# Patient Record
Sex: MALE | Race: WHITE | Employment: OTHER | URBAN - METROPOLITAN AREA
[De-identification: names, ages, dates, MRNs, and addresses within clinical notes are randomized per-mention and may not be internally consistent; named-entity substitution may affect disease eponyms.]

---

## 2017-02-01 DIAGNOSIS — C64.9: Primary | ICD-10-CM

## 2017-02-03 ENCOUNTER — PRE VISIT (OUTPATIENT)
Dept: UROLOGY | Facility: CLINIC | Age: 57
End: 2017-02-03

## 2017-02-09 ENCOUNTER — OFFICE VISIT (OUTPATIENT)
Dept: UROLOGY | Facility: CLINIC | Age: 57
End: 2017-02-09

## 2017-02-09 VITALS
BODY MASS INDEX: 30.2 KG/M2 | DIASTOLIC BLOOD PRESSURE: 80 MMHG | HEIGHT: 72 IN | HEART RATE: 83 BPM | WEIGHT: 223 LBS | SYSTOLIC BLOOD PRESSURE: 125 MMHG

## 2017-02-09 DIAGNOSIS — C64.9: ICD-10-CM

## 2017-02-09 DIAGNOSIS — C64.2 MALIGNANT NEOPLASM OF KIDNEY, LEFT (H): Primary | ICD-10-CM

## 2017-02-09 LAB
ANION GAP SERPL CALCULATED.3IONS-SCNC: 11 MMOL/L (ref 3–14)
BUN SERPL-MCNC: 23 MG/DL (ref 7–30)
CALCIUM SERPL-MCNC: 9.4 MG/DL (ref 8.5–10.1)
CHLORIDE SERPL-SCNC: 106 MMOL/L (ref 94–109)
CO2 SERPL-SCNC: 26 MMOL/L (ref 20–32)
CREAT BLD-MCNC: 1 MG/DL (ref 0.66–1.25)
CREAT SERPL-MCNC: 0.98 MG/DL (ref 0.66–1.25)
ERYTHROCYTE [DISTWIDTH] IN BLOOD BY AUTOMATED COUNT: 20.4 % (ref 10–15)
GFR SERPL CREATININE-BSD FRML MDRD: 77 ML/MIN/1.7M2
GFR SERPL CREATININE-BSD FRML MDRD: 79 ML/MIN/1.7M2
GLUCOSE SERPL-MCNC: 178 MG/DL (ref 70–99)
HCT VFR BLD AUTO: 39.2 % (ref 40–53)
HGB BLD-MCNC: 12.2 G/DL (ref 13.3–17.7)
MCH RBC QN AUTO: 25.2 PG (ref 26.5–33)
MCHC RBC AUTO-ENTMCNC: 31.1 G/DL (ref 31.5–36.5)
MCV RBC AUTO: 81 FL (ref 78–100)
PLATELET # BLD AUTO: 202 10E9/L (ref 150–450)
POTASSIUM SERPL-SCNC: 4.1 MMOL/L (ref 3.4–5.3)
RBC # BLD AUTO: 4.84 10E12/L (ref 4.4–5.9)
SODIUM SERPL-SCNC: 142 MMOL/L (ref 133–144)
WBC # BLD AUTO: 6.3 10E9/L (ref 4–11)

## 2017-02-09 ASSESSMENT — PAIN SCALES - GENERAL: PAINLEVEL: NO PAIN (0)

## 2017-02-09 NOTE — MR AVS SNAPSHOT
After Visit Summary   2017    Hank Louie    MRN: [id number]           Patient Information     Date Of Birth          1960        Visit Information        Provider Department      2017 3:00 PM Weight, Nicolas Alexandre MD [facility] and [facility]        Today's Diagnoses     Malignant neoplasm of kidney, left (H)    -  1       Follow-ups after your visit        Follow-up notes from your care team     Return in about 1 year (around 2018).      Who to contact     Please call your clinic at [phone] to:    Ask questions about your health    Make or cancel appointments    Discuss your medicines    Learn about your test results    Speak to your doctor   If you have compliments or concerns about an experience at your clinic, or if you wish to file a complaint, please contact [facility] Patient Relations at [phone] or email us at [email or web address]         Additional Information About Your Visit        MyChart Information     [employer] is an electronic gateway that provides easy, online access to your medical records. With [employer], you can request a clinic appointment, read your test results, renew a prescription or communicate with your care team.     To sign up for [employer] visit the website at [email or web address]   You will be asked to enter the access code listed below, as well as some personal information. Please follow the directions to create your username and password.     Your access code is: [email or web address]  Expires: 2017  6:30 AM     Your access code will  in 90 days. If you need help or a new code, please contact your [facility] or call [phone] for assistance.        Care EveryWhere ID     This is your Care EveryWhere ID. This could be used by other organizations to access your [place] medical records  [id number]        Your Vitals Were     Pulse  Height BMI (Body Mass Index)             83 1.829 m (6') 30.24 kg/m2          Blood Pressure from Last 3 Encounters:   02/09/17 125/80   09/22/16 131/78   02/18/16 128/82    Weight from Last 3 Encounters:   02/09/17 101.2 kg (223 lb)   09/22/16 99.3 kg (219 lb)   02/18/16 96.5 kg (212 lb 11.2 oz)              Today, you had the following     No orders found for display       Primary Care Provider Office Phone # Fax #    Kevin Ho [phone]       [place] [address]        Thank you!     Thank you for choosing [facility] UROLOGY AND [facility] PR[facility] UROLOGIC [facility]ERS  for your care. Our goal is always to provide you with excellent care. Hearing back from our patients is one way we can continue to improve our services. Please take a few minutes to complete the written survey that you may receive in the mail after your visit with us. Thank you!             Your Updated Medication List - Protect others around you: Learn how to safely use, store and throw away your medicines at www.disposemymeds.org.          This list is accurate as of: 2/9/17 11:59 PM.  Always use your most recent med list.                   Brand Name Dispense Instructions for use    acetaminophen 325 MG tablet    TYLENOL    100 tablet    Take 1-2 tablets (325-650 mg) by mouth every 4 hours as needed for mild pain or fever       acetaminophen-caffeine 500-65 MG Tabs    EXCEDRIN TENSION HEADACHE     Take 3 tablets by mouth every 6 hours as needed for mild pain       amLODIPine-benazepril 10-20 MG per capsule    LOTREL     Take 1 capsule by mouth daily       aspirin 81 MG tablet      Take by mouth daily       ATORVASTATIN CALCIUM PO      Take 40 mg by mouth       canaliflozin tablet    INVOKANA     Take 300 mg by mouth every morning (before breakfast)       CARVEDILOL PO      Take 12.5 mg by mouth       LISINOPRIL PO      Take 5 mg by mouth       losartan-hydrochlorothiazide 50-12.5 MG per  tablet    HYZAAR     Take 1 tablet by mouth daily       NITROSTAT SL      Place 0.4 mg under the tongue       sitagliptin-metFORMIN  MG per tablet    JANUMET     Take 1 tablet by mouth 2 times daily (with meals)

## 2017-02-09 NOTE — NURSING NOTE
Chief Complaint   Patient presents with     RECHECK     Adrenal Cancer Follow up     Shanda Bray RN

## 2017-02-09 NOTE — LETTER
2/9/2017       RE: Hank Louie  [address]  [place] [address]     Dear Colleague,    Thank you for referring your patient, Hank Louie, to the [facility]OLOG[facility] AND [facility]OLOG[facility] at [facility]. Please see a copy of my visit note below.    Patient needed to leave prior to being seen.  I just waived to him in the mark, so no charge for a clinic visit.    Kidney Cancer Follow up     Hank Louie is a very pleasant 56 year old male who presents with a history of a Renal Cell Cancer.      Had a 5 vessel CABG 6 months after kidney surgery     The histologic subtype was Clear Cell.    Furhman Grade 2.    Pathologic Stage T1b see below*.    Maximal tumor dimension was 6.5 cm.    Tumor thrombus level  not applicable.    Tumor necrosis present: No  Sarcomatoid features present: No  Patient is status post Robotic Partial Nephrectomy on 2/3/2015.      The 10-year estimated risk of dying from kidney cancer is: 15%, 85% chance of survival  (Journal of Urology, Vol 168, 2002: p 2395, Journal of Urology, Vol 177, 2007: p 477 [email or web address])    There is no evidence of disease recurrence to date.    CT shows no nodes, no mets per my read, CXR normal, final read pending      Recent Kidney Function  Lab Results   Component Value Date    CR 0.98 02/09/2017    CR 0.88 09/22/2016    CR 0.91 02/18/2016    CR 0.81 11/16/2015    CR 0.92 08/13/2015    CR 0.86 05/07/2015    CR 0.82 02/05/2015    CR 0.92 02/04/2015         Plan Hx of Renal Cell Cancer [id number], Furhman grade 2, s/p Robotic Partial Nephrectomy   Follow up in 12 months with    CT of Abd    Chest XR    BMP, CBC      Nicolas Masters MD  Department of Urology  [facility]      *AJCC/pTNM staging 2010 where   T1a (<=4cm), T1b  (4-7cm)  T2a (7-10 cm), T2b (>10 cm)   T3a (renal vein thrombus, perirenal or renal sinus fat invasion)   T3b (tumor thrombus to  IVC, below diaphragm)  T3c (tumor thrombus to IVC, above diaphragm)   T4 (tumor invades beyond Gerota's fascia)  Nx regional nodes not assessed  N0 No lymph node metastasis  N1 Lymph node metastasis  M0 No distant Mets  M1 Distant Mestastasis

## 2017-02-13 NOTE — PROGRESS NOTES
Patient needed to leave prior to being seen.  I just waived to him in the mark, so no charge for a clinic visit.    Kidney Cancer Follow up     Hank Louie is a very pleasant 56 year old male who presents with a history of a Renal Cell Cancer.      Had a 5 vessel CABG 6 months after kidney surgery     The histologic subtype was Clear Cell.    Furhman Grade 2.    Pathologic Stage T1b see below*.    Maximal tumor dimension was 6.5 cm.    Tumor thrombus level  not applicable.    Tumor necrosis present: No  Sarcomatoid features present: No  Patient is status post Robotic Partial Nephrectomy on 2/3/2015.      The 10-year estimated risk of dying from kidney cancer is: 15%, 85% chance of survival  (Journal of Urology, Vol 168, 2002: p 2395, Journal of Urology, Vol 177, 2007: p 477 [email or web address])    There is no evidence of disease recurrence to date.    CT shows no nodes, no mets per my read, CXR normal, final read pending      Recent Kidney Function  Lab Results   Component Value Date    CR 0.98 02/09/2017    CR 0.88 09/22/2016    CR 0.91 02/18/2016    CR 0.81 11/16/2015    CR 0.92 08/13/2015    CR 0.86 05/07/2015    CR 0.82 02/05/2015    CR 0.92 02/04/2015         Plan Hx of Renal Cell Cancer [id number], Furhman grade 2, s/p Robotic Partial Nephrectomy   Follow up in 12 months with    CT of Abd    Chest XR    BMP, CBC      Nicolas Masters MD  Department of Urology  [facility]      *AJCC/pTNM staging 2010 where   T1a (<=4cm), T1b  (4-7cm)  T2a (7-10 cm), T2b (>10 cm)   T3a (renal vein thrombus, perirenal or renal sinus fat invasion)   T3b (tumor thrombus to IVC, below diaphragm)  T3c (tumor thrombus to IVC, above diaphragm)   T4 (tumor invades beyond Gerota's fascia)  Nx regional nodes not assessed  N0 No lymph node metastasis  N1 Lymph node metastasis  M0 No distant Mets  M1 Distant Mestastasis

## 2018-05-09 ENCOUNTER — TRANSFERRED RECORDS (OUTPATIENT)
Dept: HEALTH INFORMATION MANAGEMENT | Facility: CLINIC | Age: 58
End: 2018-05-09

## 2018-05-24 DIAGNOSIS — L02.213 CHEST WALL ABSCESS: Primary | ICD-10-CM

## 2019-02-08 ENCOUNTER — TELEPHONE (OUTPATIENT)
Dept: UROLOGY | Facility: CLINIC | Age: 59
End: 2019-02-08

## 2019-02-08 NOTE — TELEPHONE ENCOUNTER
Health Call Center    Phone Message    May a detailed message be left on voicemail: yes    Reason for Call: Form or Letter   Type or form/letter needing completion: Referral to [facility]   Provider: Dr. Sonam Cole form needed: ASAP  Once completed: Fax form to: [facility], fax number [phone]      Pt called in requesting a referral for his cancer check ups to [facility], he says his insurance no longer covers his visits at the . Pt asking for a call back to let him know when this has been sent for him.     Action Taken: Message routed to:  [facility] Urology

## 2019-03-25 NOTE — TELEPHONE ENCOUNTER
Message left on patient's voicemail stating that he does not know anyone from [facility].      Jonatan Edgar MA